# Patient Record
Sex: MALE | Race: BLACK OR AFRICAN AMERICAN | Employment: PART TIME | URBAN - METROPOLITAN AREA
[De-identification: names, ages, dates, MRNs, and addresses within clinical notes are randomized per-mention and may not be internally consistent; named-entity substitution may affect disease eponyms.]

---

## 2017-09-07 ENCOUNTER — ALLIED HEALTH/NURSE VISIT (OUTPATIENT)
Dept: NURSING | Facility: CLINIC | Age: 42
End: 2017-09-07
Payer: COMMERCIAL

## 2017-09-07 VITALS
TEMPERATURE: 97.9 F | OXYGEN SATURATION: 97 % | HEART RATE: 80 BPM | SYSTOLIC BLOOD PRESSURE: 115 MMHG | DIASTOLIC BLOOD PRESSURE: 77 MMHG

## 2017-09-07 DIAGNOSIS — R07.1 CHEST PAIN ON BREATHING: ICD-10-CM

## 2017-09-07 DIAGNOSIS — R05.9 COUGH: Primary | ICD-10-CM

## 2017-09-07 PROCEDURE — 99207 ZZC NO CHARGE NURSE ONLY: CPT

## 2017-09-07 NOTE — MR AVS SNAPSHOT
"              After Visit Summary   2017    Malik Keller    MRN: [id number]           Patient Information     Date Of Birth          1975        Visit Information        Provider Department      2017 1:30 PM HP RN/TRIAGE NURSE ONLY [facility]        Today's Diagnoses     Cough    -  1    Chest pain on breathing           Follow-ups after your visit        Who to contact     If you have questions or need follow up information about today's clinic visit or your schedule please contact [facility] directly at [phone].  Normal or non-critical lab and imaging results will be communicated to you by IMScoutinghart, letter or phone within 4 business days after the clinic has received the results. If you do not hear from us within 7 days, please contact the clinic through H?RELt or phone. If you have a critical or abnormal lab result, we will notify you by phone as soon as possible.  Submit refill requests through Woopie or call your pharmacy and they will forward the refill request to us. Please allow 3 business days for your refill to be completed.          Additional Information About Your Visit        MyChart Information     Woopie lets you send messages to your doctor, view your test results, renew your prescriptions, schedule appointments and more. To sign up, go to [email or web address] . Click on \"Log in\" on the left side of the screen, which will take you to the Welcome page. Then click on \"Sign up Now\" on the right side of the page.     You will be asked to enter the access code listed below, as well as some personal information. Please follow the directions to create your username and password.     Your access code is: [email or web address]  Expires: 2017  1:38 PM     Your access code will  in 90 days. If you need help or a new code, please call your [facility] or [phone].        Care EveryWhere ID     This is your Care EveryWhere ID. This could " be used by other organizations to access your [place] medical records  [id number]        Your Vitals Were     Pulse Temperature Pulse Oximetry             80 97.9  F (36.6  C) (Oral) 97%          Blood Pressure from Last 3 Encounters:   09/07/17 115/77   02/13/15 104/58   11/03/14 130/79    Weight from Last 3 Encounters:   02/13/15 176 lb (79.8 kg)   11/03/14 172 lb (78 kg)              Today, you had the following     No orders found for display       Primary Care Provider    None Specified       No primary provider on file.        Equal Access to Services     [facility]: Hadii emma plunkett hadasho Sojuno, waaxda luqadaha, qaybta kaalmada christopher, genoveva madden . So [facility] [phone].    ATENCIÓN: Si habla español, tiene a muñoz disposición servicios gratuitos de asistencia lingüística. Llame al [phone].    We comply with applicable federal civil rights laws and [place] laws. We do not discriminate on the basis of race, color, national origin, age, disability sex, sexual orientation or gender identity.            Thank you!     Thank you for choosing [facility]  for your care. Our goal is always to provide you with excellent care. Hearing back from our patients is one way we can continue to improve our services. Please take a few minutes to complete the written survey that you may receive in the mail after your visit with us. Thank you!             Your Updated Medication List - Protect others around you: Learn how to safely use, store and throw away your medicines at www.disposemymeds.org.          This list is accurate as of: 9/7/17  1:38 PM.  Always use your most recent med list.                   Brand Name Dispense Instructions for use Diagnosis    albuterol 108 (90 BASE) MCG/ACT Inhaler    PROAIR HFA/PROVENTIL HFA/VENTOLIN HFA    1 Inhaler    Inhale 2 puffs into the lungs every 4 hours as needed for shortness of breath / dyspnea or wheezing    Mild  intermittent asthma       fluticasone-salmeterol 100-50 MCG/DOSE diskus inhaler    ADVAIR    1 Inhaler    Inhale 1 puff into the lungs 2 times daily    Wheezing       ipratropium - albuterol 0.5 mg/2.5 mg/3 mL 0.5-2.5 (3) MG/3ML neb solution    DUONEB    1 vial    1 neb in clinic    Mild intermittent asthma

## 2017-09-07 NOTE — NURSING NOTE
Triaged walkin patient who c/o right upper chest pain onset Tuesday.  Is a smoker.  Pain Worsens with taking a deep breath.  Denies SOB.  States he has had bouts of asthma flare ups in past and has had an inhaler at home.  No wheezing noted at this time that is audible.   Has a cough sometimes productive with white sputum.  VSS. Not in acute distress. 02 Sats within normal range.    Plan:  Offered appt today. He was not able to make that time so would like ot come back for UC at 5 pm.    Patient leaves ambulatory in no distress and will come back he states.  He does not state he has a primary care clinic.  Aidee Ryan RN

## 2020-05-10 ENCOUNTER — OFFICE VISIT (OUTPATIENT)
Dept: URGENT CARE | Facility: URGENT CARE | Age: 45
End: 2020-05-10

## 2020-05-10 VITALS
DIASTOLIC BLOOD PRESSURE: 70 MMHG | BODY MASS INDEX: 28.8 KG/M2 | OXYGEN SATURATION: 99 % | WEIGHT: 183.9 LBS | TEMPERATURE: 97.9 F | HEART RATE: 91 BPM | SYSTOLIC BLOOD PRESSURE: 109 MMHG

## 2020-05-10 DIAGNOSIS — B02.9 HERPES ZOSTER WITHOUT COMPLICATION: Primary | ICD-10-CM

## 2020-05-10 PROCEDURE — 99203 OFFICE O/P NEW LOW 30 MIN: CPT | Performed by: FAMILY MEDICINE

## 2020-05-10 RX ORDER — VALACYCLOVIR HYDROCHLORIDE 1 G/1
1000 TABLET, FILM COATED ORAL 3 TIMES DAILY
Qty: 21 TABLET | Refills: 0 | Status: SHIPPED | OUTPATIENT
Start: 2020-05-10 | End: 2021-05-12

## 2020-05-10 ASSESSMENT — PAIN SCALES - GENERAL: PAINLEVEL: WORST PAIN (10)

## 2020-05-10 NOTE — PROGRESS NOTES
SUBJECTIVE:  Malik Keller is a 44 year old male who presents to the clinic today for rash on the left lateral neck and at the left posterior scalp accompanied by 10 out of 10 pain at the area of the rash.  .    Onset of rash was one day ago.   Rash is spreading.  .  .  Quality/symptoms of rash, pain.     rash seems to be worsening.  No fevers.  .  Previous history of a similar rash? no  Recent exposure history: none known  Patient started applying [place].  .    Past Medical History:    No major medical problems.     No current outpatient medications on file.     Social History     Tobacco Use     Smoking status: Former Smoker     Smokeless tobacco: Never Used   Substance Use Topics     Alcohol use: Not on file       ROS:  CONSTITUTIONAL:no fevers.    INTEGUMENTARY/SKIN:  Positive for painful rash.    EYES:  No eye pain/vision problems.   ENT/MOUTH: no ear/nose/mouth pain.    RESP: No shortness of breath.      EXAM:   /70   Pulse 91   Temp 97.9  F (36.6  C) (Tympanic)   Wt 83.4 kg (183 lb 14.4 oz)   SpO2 99%   BMI 28.80 kg/m    GENERAL: alert and severe pain.    SKIN: Rash description:    Distribution: dermatomal  Location: left lateral neck, left posterior scalp.  No left eye involvement.      Color: red,  Lesion type: vesicular, blotchy with no other findings    ASSESSMENT:  Shingles (Herpes Zoster)    PLAN:  Rx:  Valacyclovir and Tylenol #3  follow up if not better in 5-7days.   Ibuprofen  Go to the emergency room if you develop left eye pain, decreased vision, fevers.  Patient can place ice over the painful areas to numb up some of the pain.     Gideon Mcdonald MD

## 2020-05-11 ENCOUNTER — OFFICE VISIT (OUTPATIENT)
Dept: URGENT CARE | Facility: URGENT CARE | Age: 45
End: 2020-05-11

## 2020-05-11 ENCOUNTER — NURSE TRIAGE (OUTPATIENT)
Dept: NURSING | Facility: CLINIC | Age: 45
End: 2020-05-11

## 2020-05-11 VITALS
WEIGHT: 183 LBS | OXYGEN SATURATION: 97 % | SYSTOLIC BLOOD PRESSURE: 114 MMHG | HEART RATE: 87 BPM | DIASTOLIC BLOOD PRESSURE: 71 MMHG | TEMPERATURE: 99.5 F | BODY MASS INDEX: 28.66 KG/M2

## 2020-05-11 DIAGNOSIS — B02.9 HERPES ZOSTER WITHOUT COMPLICATION: Primary | ICD-10-CM

## 2020-05-11 PROCEDURE — 99213 OFFICE O/P EST LOW 20 MIN: CPT | Performed by: PHYSICIAN ASSISTANT

## 2020-05-11 RX ORDER — HYDROCODONE BITARTRATE AND ACETAMINOPHEN 7.5; 325 MG/1; MG/1
1 TABLET ORAL EVERY 6 HOURS PRN
Qty: 12 TABLET | Refills: 0 | Status: SHIPPED | OUTPATIENT
Start: 2020-05-11 | End: 2021-05-12

## 2020-05-11 NOTE — TELEPHONE ENCOUNTER
Pt calling regarding regarding visit yesterday.  Was dx with shingles.   Pt was given Tylenol #3 and Valacyclovir.   Tylenol #3 is not helping the pain.   Rates the pain 10/10.  Last took a Tylenol #3 was at 2:08.   Neck and ear, severe pain on left side.    RN recommended virtual  visit. Pt wants to go to urgent care near him, where he was seen yesterday.  Pt is sure that he wants to go to the urgent care, does not want phone visit.  RN offered to see if they have appointments at the urgent care to help prepare urgent care.  Pt was agreeable to this and was transferred to scheduling to assist making appointment.     Vivian Nelson RN   [facility] RN Triage       Reason for Disposition    SEVERE pain (e.g., excruciating)    Additional Information    Negative: Difficult to awaken or acting confused (e.g., disoriented, slurred speech)    Negative: Sounds like a life-threatening emergency to the triager    Negative: Patient sounds very sick or weak to the triager    Negative: [1] Localized rash AND [2] doesn't match the SYMPTOMS of shingles    Negative: [1] Back pain AND [2] doesn't match the SYMPTOMS of shingles    Negative: Fever > 100.5 F (38.1 C)    Negative: [1] Shingles rash AND [2] spots start appearing other places on body    Negative: [1] Shingles rash of face AND [2] eye pain or blurred vision    Negative: [1] Shingles rash of face or ear AND [2] earache or ringing in the ear    Negative: [1] Shingles rash of face AND [2] facial weakness    Negative: Shingles rash on the eyelid or tip of the nose    Negative: [1] Shingles rash (matches SYMPTOMS) AND [2] weak immune system (e.g., HIV positive,  cancer chemotherapy, chronic steroid treatment, splenectomy) AND [3] NOT taking antiviral medication    Protocols used: SHINGLES-A-

## 2020-05-11 NOTE — LETTER
[facility]  [address]  [address]  [place] [address]  Phone: [phone]  Fax: [phone]    May 11, 2020        Malik Keller  [address]  [place] [address]          To whom it may concern:    RE: Malik Keller    Patient was seen and treated today at our clinic. Please excuse from work 5/14/2020 - 5/16/2020.     Please contact me for questions or concerns.      Sincerely,        Shilpi Marin PA-C

## 2020-05-12 NOTE — PATIENT INSTRUCTIONS
Use a cool compress on the area and aloe vera.       Patient Education     Shingles  Shingles is a viral infection caused by the same virus that causes chicken pox. Anyone who has had chicken pox may get shingles later in life. The virus stays in the body, but remains asleep (dormant). Shingles often occurs in older persons or persons with lowered immunity. But it can affect anyone at any age.  Shingles starts as a tingling patch of skin on one side of the body. Small, painful blisters may then appear. The rash rarely spreads to other parts of the body.  Exposure to shingles can't cause shingles. However, it can cause chicken pox in anyone who has not had chicken pox or has not been vaccinated. The contagious period ends when all blisters have crusted over, generally 1 to 2 weeks after the illness starts.  After the blisters heal, the affected skin may be sensitive or painful for weeks or months, gradually resolving over time. But, sometimes this can last longer and be permanent (called postherpetic neuralgia.)  Shingles vaccines are available. Vaccination can help prevent shingles or make it less painful. It is generally recommended for adults older than 50, even if you've had singles in the past. Talk with your healthcare provider about when to get vaccinated and which vaccine is best for you.  Home care    Medicines may be prescribed to help relieve pain. Take these medicines as directed. Ask your healthcare provider or pharmacist before using over-the-counter medicines for helping treat pain and itching.    In certain cases, antiviral medicines may be prescribed to reduce pain, shorten the illness, and prevent neuralgia. Take these medicines as directed.    Compresses made from a solution of cool water mixed with cornstarch or baking soda may help relieve pain and itching.     Gently wash skin daily with soap and water to help prevent infection. Be certain to rinse off all of the soap, which can be  irritating.    Trim fingernails and try not to scratch. Scratching the sores may leave scars.    Stay home from work or school until all blisters have formed a crust and you are no longer contagious.  Follow-up care  Follow up with your healthcare provider, or as directed.  When to seek medical advice    Fever of 100.4 F (38 C) or higher, or as directed by your healthcare provider    Affected skin is on the face or neck and any of the following occur:  ? Headache  ? Eye pain  ? Changes in vision  ? Sores near the eye  ? Weakness of facial muscles    Blisters occurring on new areas of the body    Pain, redness, or swelling of a joint    Signs of skin infection: colored drainage from the sores, warmth, increasing redness, fever, or increasing pain  Date Last Reviewed: 4/1/2018 2000-2019 [employer]. [address] [place] [id number]. All rights reserved. This information is not intended as a substitute for professional medical care. Always follow your healthcare professional's instructions.

## 2021-05-12 ENCOUNTER — OFFICE VISIT (OUTPATIENT)
Dept: URGENT CARE | Facility: URGENT CARE | Age: 46
End: 2021-05-12
Payer: OTHER GOVERNMENT

## 2021-05-12 VITALS
DIASTOLIC BLOOD PRESSURE: 84 MMHG | HEART RATE: 78 BPM | OXYGEN SATURATION: 98 % | SYSTOLIC BLOOD PRESSURE: 139 MMHG | TEMPERATURE: 98 F | RESPIRATION RATE: 20 BRPM

## 2021-05-12 DIAGNOSIS — J45.21 MILD INTERMITTENT ASTHMA WITH EXACERBATION: Primary | ICD-10-CM

## 2021-05-12 DIAGNOSIS — U07.1 2019 NOVEL CORONAVIRUS DISEASE (COVID-19): ICD-10-CM

## 2021-05-12 DIAGNOSIS — R05.9 COUGH: ICD-10-CM

## 2021-05-12 PROCEDURE — 99214 OFFICE O/P EST MOD 30 MIN: CPT | Performed by: FAMILY MEDICINE

## 2021-05-12 RX ORDER — ALBUTEROL SULFATE 90 UG/1
2 AEROSOL, METERED RESPIRATORY (INHALATION) EVERY 6 HOURS PRN
Qty: 18 G | Refills: 0 | Status: SHIPPED | OUTPATIENT
Start: 2021-05-12

## 2021-05-12 NOTE — PROGRESS NOTES
SUBJECTIVE:   Malik Keller is a 45 year old male presenting with a chief complaint of cough.  Endorsed wheezing.  No fever.  Onset of symptoms was 3 week(s) ago.  Course of illness is worsening.    Severity moderate  Current and Associated symptoms: cough, wheezing  Treatment measures tried include Fluids and Rest.  Predisposing factors include asthma.    COVID positive April 12, denies any symptoms.  Patient states that had gathering with friends and was told that friends were positive so he went to get tested.    Coughing intermittent, worse when laying down.    Has old inhaler, last time used was 2 years ago.    Prior TOB use.  Negative for family history of asthma    History reviewed. No pertinent past medical history.  Current Outpatient Medications   Medication Sig Dispense Refill     albuterol (PROAIR HFA/PROVENTIL HFA/VENTOLIN HFA) 108 (90 Base) MCG/ACT inhaler Inhale 2 puffs into the lungs every 6 hours as needed for shortness of breath / dyspnea or wheezing 18 g 0     Social History     Tobacco Use     Smoking status: Former Smoker     Smokeless tobacco: Never Used   Substance Use Topics     Alcohol use: Not on file       ROS:  Review of systems negative except as stated above.    OBJECTIVE:  /84   Pulse 78   Temp 98  F (36.7  C) (Tympanic)   Resp 20   SpO2 98%   GENERAL APPEARANCE: healthy, alert and no distress  EYES: EOMI,  PERRL, conjunctiva clear  RESP: lungs with few scattered wheezes  CV: regular rates and rhythm, normal S1 S2, no murmur noted  PSYCH: mentation appears normal and affect normal/bright      ASSESSMENT/PLAN:  (J45.21) Mild intermittent asthma with exacerbation  (primary encounter diagnosis)  Plan: albuterol (PROAIR HFA/PROVENTIL HFA/VENTOLIN         HFA) 108 (90 Base) MCG/ACT inhaler            (R05) Cough  Plan: albuterol (PROAIR HFA/PROVENTIL HFA/VENTOLIN         HFA) 108 (90 Base) MCG/ACT inhaler            (U07.1) 2019 novel coronavirus disease (COVID-19)  Comment:  asymptomatic   Plan: reassurance      Reassurance given, reviewed symptomatic treatment with tylenol, ibuprofen, plenty of fluids and rest.  Reviewed COVID infection and that this virus may have triggered asthma flare.  Patient is outside 10 day quarantine already and reported that has received Moderna vaccination X1 already.  RX albuterol inhaler given for asthma flare.  Okay to take OTC cough syrup as needed.  Offered CXR but declined.    Recommend to establish with PCP for follow up of asthma care and routine CPE.    Andres Machado MD  May 12, 2021 11:18 AM

## 2021-05-19 ENCOUNTER — OFFICE VISIT (OUTPATIENT)
Dept: URGENT CARE | Facility: URGENT CARE | Age: 46
End: 2021-05-19

## 2021-05-19 ENCOUNTER — ANCILLARY PROCEDURE (OUTPATIENT)
Dept: GENERAL RADIOLOGY | Facility: CLINIC | Age: 46
End: 2021-05-19
Attending: FAMILY MEDICINE

## 2021-05-19 VITALS
HEART RATE: 78 BPM | RESPIRATION RATE: 20 BRPM | DIASTOLIC BLOOD PRESSURE: 78 MMHG | OXYGEN SATURATION: 99 % | SYSTOLIC BLOOD PRESSURE: 120 MMHG | TEMPERATURE: 98 F

## 2021-05-19 DIAGNOSIS — R05.3 PERSISTENT COUGH: Primary | ICD-10-CM

## 2021-05-19 DIAGNOSIS — J45.909 MILD ASTHMA WITHOUT COMPLICATION, UNSPECIFIED WHETHER PERSISTENT: ICD-10-CM

## 2021-05-19 DIAGNOSIS — R06.2 WHEEZING: ICD-10-CM

## 2021-05-19 PROCEDURE — 71046 X-RAY EXAM CHEST 2 VIEWS: CPT | Performed by: RADIOLOGY

## 2021-05-19 PROCEDURE — 99214 OFFICE O/P EST MOD 30 MIN: CPT | Performed by: FAMILY MEDICINE

## 2021-05-19 RX ORDER — BENZONATATE 100 MG/1
100 CAPSULE ORAL 3 TIMES DAILY PRN
Qty: 30 CAPSULE | Refills: 0 | Status: SHIPPED | OUTPATIENT
Start: 2021-05-19 | End: 2021-05-27

## 2021-05-19 RX ORDER — CODEINE PHOSPHATE AND GUAIFENESIN 10; 100 MG/5ML; MG/5ML
1-2 SOLUTION ORAL EVERY 4 HOURS PRN
Qty: 118 ML | Refills: 0 | Status: SHIPPED | OUTPATIENT
Start: 2021-05-19 | End: 2021-05-27

## 2021-05-19 NOTE — PROGRESS NOTES
Chief Complaint   Patient presents with     Urgent Care     Cough     COUGH PT WAS SEEN last wk        Medical Decision Making:    ASSESMENT AND PLAN     Malik was seen today for urgent care and cough.    Diagnoses and all orders for this visit:    Persistent cough  -     XR Chest 2 Views    Wheezing    Mild asthma without complication, unspecified whether persistent        Tylenol, Fluids, Rest and Rx Asthma exacerbation  Albuterol MDI  Patient to continue an inhaler for the wheezing symptoms: Tessalon Perles help with the coughing in the day also Robitussin with codeine at night advised not to drive when taking the codeine medication.  Oxygen saturation was 99% when rechecked discussed with patient if notices any worsening breathing or any chest pain or high fever chills or any exertional symptoms should follow-up.  I have reviewed the nursing notes.    Differential Diagnosis:  [facility] Adult/Peds:  Asthma, Asthma exacerbation, Pneumonia, Viral pharyngitis, Viral syndrome, Viral upper respiratory illness and covid     Review of the result(s) of each unique test -     X-Ray was done, my findings are:normal no infiltrate noted      Time  spent on the date of the encounter doing chart review, review of test results, interpretation of tests, patient visit and documentation     If not improving or if condition worsens, follow up with your Primary Care Provider  Patient is outside 10 day quarantine already and reported that has received Moderna vaccination X1 already.      see orders in Epic  Pt verbalized and agreed with the plan and is aware of the worsening symptoms for which would need to follow up .  Pt was stable during time of discharge from the clinic     SUBJECTIVE     Malik Keller is a 45 year old male presenting with a chief complaint of    Chief Complaint   Patient presents with     Urgent Care     Cough     COUGH PT WAS SEEN last wk            Malik Keller is a 45 year old male with h/o covid 1 month  ago  presenting with a chief complaint of coughing ,productive, denies any fever or chills   Has no chest pain or acute sob  . He is an established patient of [facility].  Onset of symptoms was 2 week(s) ago.  Course of illness is worsening.    Severity moderate  Current and Associated symptoms: cough - productive and wheezing  Treatment measures tried include Inhaler (name: ).  Predisposing factors include HX of asthma.  He was seen 2 ee  History reviewed. No pertinent past medical history.  Current Outpatient Medications   Medication Sig Dispense Refill     albuterol (PROAIR HFA/PROVENTIL HFA/VENTOLIN HFA) 108 (90 Base) MCG/ACT inhaler Inhale 2 puffs into the lungs every 6 hours as needed for shortness of breath / dyspnea or wheezing 18 g 0     Social History     Tobacco Use     Smoking status: Former Smoker     Smokeless tobacco: Never Used   Substance Use Topics     Alcohol use: Not on file     History reviewed. No pertinent family history.      ROS:    10 point ROS of systems including Constitutional, Eyes,  Cardiovascular, Gastroenterology, Genitourinary, Integumentary, Muscularskeletal, Psychiatric ,neurological were all negative except for pertinent positives noted in my HPI         OBJECTIVE:    /78   Pulse 78   Temp 98  F (36.7  C) (Rectal)   Resp 20   SpO2 93%   GENERAL APPEARANCE: healthy, alert and no distress  EYES: EOMI,  PERRL, conjunctiva clear  HENT: ear canals and TM's normal.  Nose and mouth without ulcers, erythema or lesions  NECK: supple, nontender, no lymphadenopathy  RESP: lungs clear to auscultation - no rales, rhonchi positive for  wheezes  CV: regular rates and rhythm, normal S1 S2, no murmur noted  ABDOMEN:  soft, nontender, no HSM or masses and bowel sounds normal  SKIN: no suspicious lesions or rashes  PSYCH: mentation appears normal  Physical Exam      (Note was completed, in part, with [employer] voice-recognition software. Documentation reviewed, but some grammatical, spelling,  and word errors may remain.)  Katty Shelton MD on 5/19/2021 at 1:20 PM

## 2021-05-21 ENCOUNTER — OFFICE VISIT (OUTPATIENT)
Dept: URGENT CARE | Facility: URGENT CARE | Age: 46
End: 2021-05-21

## 2021-05-21 VITALS
SYSTOLIC BLOOD PRESSURE: 118 MMHG | BODY MASS INDEX: 28.44 KG/M2 | RESPIRATION RATE: 20 BRPM | OXYGEN SATURATION: 99 % | HEART RATE: 90 BPM | TEMPERATURE: 99.1 F | WEIGHT: 181.6 LBS | DIASTOLIC BLOOD PRESSURE: 72 MMHG

## 2021-05-21 DIAGNOSIS — J45.41 MODERATE PERSISTENT ASTHMA WITH ACUTE EXACERBATION: Primary | ICD-10-CM

## 2021-05-21 PROCEDURE — 99214 OFFICE O/P EST MOD 30 MIN: CPT | Mod: 25 | Performed by: PHYSICIAN ASSISTANT

## 2021-05-21 PROCEDURE — 94640 AIRWAY INHALATION TREATMENT: CPT | Mod: ZZRT | Performed by: PHYSICIAN ASSISTANT

## 2021-05-21 RX ORDER — PREDNISONE 20 MG/1
40 TABLET ORAL DAILY
Qty: 10 TABLET | Refills: 0 | Status: SHIPPED | OUTPATIENT
Start: 2021-05-21 | End: 2021-05-27

## 2021-05-21 RX ORDER — AZITHROMYCIN 250 MG/1
TABLET, FILM COATED ORAL
Qty: 6 TABLET | Refills: 0 | Status: SHIPPED | OUTPATIENT
Start: 2021-05-21 | End: 2021-05-21

## 2021-05-21 RX ORDER — PREDNISONE 20 MG/1
40 TABLET ORAL DAILY
Qty: 10 TABLET | Refills: 0 | Status: SHIPPED | OUTPATIENT
Start: 2021-05-21 | End: 2021-05-21

## 2021-05-21 RX ORDER — AZITHROMYCIN 250 MG/1
TABLET, FILM COATED ORAL
Qty: 6 TABLET | Refills: 0 | Status: SHIPPED | OUTPATIENT
Start: 2021-05-21 | End: 2021-05-27

## 2021-05-21 RX ORDER — IPRATROPIUM BROMIDE AND ALBUTEROL SULFATE 2.5; .5 MG/3ML; MG/3ML
3 SOLUTION RESPIRATORY (INHALATION) ONCE
Status: COMPLETED | OUTPATIENT
Start: 2021-05-21 | End: 2021-05-21

## 2021-05-21 RX ADMIN — IPRATROPIUM BROMIDE AND ALBUTEROL SULFATE 3 ML: 2.5; .5 SOLUTION RESPIRATORY (INHALATION) at 14:58

## 2021-05-21 NOTE — PATIENT INSTRUCTIONS
Prednisone -- to take for wheezing. Start to take this TODAY.   The antibiotic azithromycin, to be used if you have fever, chills or worsening symptoms  TO ER / UC if you develop chest pain or significant shortness of breath.       Patient Education     Asthma  What is asthma?  Asthma is a long-term (chronic) ?lung disease. The airways react to triggers (allergens and irritants). This makes it hard to breathe. With exposure to triggers, changes can occur such as:     The airways become swollen and inflamed.    The muscles around the airways tighten.    More mucus is made. This leads to mucus plugs.  All of these changes make the airways narrow. This makes it hard for air to go out of the lungs. And fresh oxygen can't get into the body.   What causes asthma?  Experts don't know the exact cause of asthma. They believe it is partly inherited. The environment, infections, and chemicals released by the body also play a role.   Exercise causes symptoms in many people with asthma. Symptoms can occur during exercise. They can also occur right after exercise. In some people, stress or strong feelings can cause symptoms.   All of these may be asthma triggers:   Allergens Respiratory problem         Pollens (trees, grasses, and weeds)    Mold    Pets    Dust and dust mites    Cockroaches    Mice       Nasal allergies    Sinus infections    The flu    Viral infections, including the common cold   Irritants Medicines         Strong odors from perfumes, , cooking, paints, and varnishes    Chemicals (gases, fumes)    Air pollution    Changing weather (temperature, barometric pressure, humidity, and strong winds)    Smoke (tobacco-inhaled or secondhand)       Aspirin    NSAIDs (nonsteroidal anti-inflammatory drugs) such as ibuprofen   Other conditions Other         GERD (gastroesophageal reflux)    Sleep apnea    Overweight    Depression       Exercise, especially in cold weather    Strong feelings that go along with  laughing or crying       Who is at risk for asthma?  It is most common in:     Children and teens ages 5 to17    People living in cities  Other factors include:    Personal or family history of asthma or allergies    Exposure to secondhand tobacco smoke    Children with a family history of asthma    Children who have allergies or atopic dermatitis    Children exposed to secondhand and tobacco smoke    Living in areas with high levels of environmental air pollution  What are the symptoms of asthma?  Symptoms include:    Trouble breathing or shortness of breath    Chest tightness    Wheezing or a whistling sound when breathing    Coughing    Breathing becomes harder and may hurt    Talking and sleeping may be harder with severe symptoms  How is asthma diagnosed?  Your healthcare provider will ask about your health history. They will give you a physical exam. You will also have other tests. An important test is spirometry.   A spirometer is a device used to find out how well the lungs are working. It measures the amount and speed of air breathed out.   You may have other tests. These are done to check for conditions such as allergies.   How is asthma treated?  Treatment will depend on your symptoms, age, and general health. It will also depend on how severe the condition is.   There is no cure for asthma. It can often be controlled by staying away from triggers. And by taking medicines as prescribed by your healthcare provider.   Watching for symptoms and taking early, appropriate action is a key part of asthma care. So is knowing what to do if symptoms get worse. Experts advise making an Asthma Action Plan with your provider and educating your family and close friends about its purpose and content. This will help them provide correct asthma care if you are ill and can't care for yourself.   Medicines for asthma  The 2 types of asthma medicines are long-term control and short-term (quick-relief) medicines. Long-term  control medicines are often taken every day. They help prevent symptoms. Quick-relief medicines calm asthma symptoms fast. But they only last for a short time. You may take either type of medicine alone. Some people take both.   Your healthcare provider should regularly check and adjust your medicines as needed.   Long-term control medicines  At first, it may take a few weeks for long-term control medicines to work. You must take these medicines every day. These medicines include:     Anti-inflammatory medicines. These medicines reduce or prevent airway swelling.    Bronchodilators. These relax muscles around the airways.    Leukotriene modifiers. These block the action of chemicals called leukotrienes. These are chemicals that cause airways to be inflamed and narrowed.     Biologic therapy. For people with asthma that isn't well controlled despite inhaler therapy, there are some newer medicines. These target the inflammatory cells in the body that start the asthma reaction. These medicines include anti-IL4, Anti-IL5, and anti-IgE medicines. They are often given by shot (injection) or infusion.  Quick-relief medicines  Quick-relief medicines quickly relax the muscles around the airways. But the relief only lasts about 2 to 3 hours.   These medicines may include:    Inhaled short-acting beta2-agonists .  These help relax muscles around the airways.    Inhaled anticholinergics . These block a chemical in the body called acetylcholine. This chemical contracts the muscles. It also causes more mucus in the airways.  Inhalation devices for asthma  Inhaled medicines go right to the lungs. They have fewer side effects than medicines taken by mouth. Inhaled medicines may be anti-inflammatory or bronchodilating. Or they may be both. The devices used are:     Metered-dose inhaler (MDI). This is the most common type of inhaler. It uses a chemical to push the medicine out of the inhaler. MDIs are held in front of or put into the  mouth. Then the medicine is released in puffs. Or they may be used with a spacer device.    Nebulizer. This device sprays a fine mist of medicine. This is done through a mask using air under pressure, or an ultrasonic machine. A mouthpiece or mask is connected to a machine by plastic tubing to deliver medicine.    Dry powder or rotary inhaler.  These inhalers deliver powered medicine as you breathe.  Living with asthma  Staying away from triggers is key in managing asthma. Triggers are specific to the individual and may include such things as allergens, irritants, other health problems, exercise, medicines, and strong emotions. The following can help you limit your exposure:   Allergies    Dust. Dust is the most common year-round allergen. The allergy is caused by tiny dust mites. Dust mites are found in mattresses, carpets, and fabric-covered (upholstered) furniture such as sofas and chairs. They live best in warm, humid conditions. It's important to limit your exposure. Keep your living area as clean as possible by vacuuming and dusting on a weekly basis. Keep the use of carpets to a minimum, and take extra care in the bedroom. Put dust mite covers on your mattress, box spring, and pillows.    Pollens. You may be allergic to pollen. If so, during pollen season keep all car and house windows closed. Use air conditioning. If you have been outside, shower, wash your hair, and change clothes when you go inside.     Pets. Pets that have fur or feathers often cause allergies. If you have pets, try not to touch them. If you do pet or handle them, wash your hands afterward. Keep pets off your furniture, bed, and out of your bedroom. Have someone brush and bathe your pet often.    Mold and mildew.  These can trigger asthma. When outside, stay away from damp, shady areas. Use exhaust fans when cooking or bathing. Keep indoor humidity below 45%. And drain and clean your dehumidifier often.    Exercise  Exercise is a common  asthma trigger. But don't limit sports or exercise unless a healthcare provider tells you to. Exercise is good for your health and lungs. Swimming, golf, and karate are good choices if you have asthma. Always warm up before exercise. And cool down after. Ask your provider about using your quick-relief medicine before starting exercise. Keep a log of what types of exercise trigger asthma problems and talk with your provider about possible ways to manage your symptoms.   Irritants  If you smoke, quit. This is hard to do, but your provider can help provide resources to aid your success.   Stay away from secondhand and thirdhand smoke. Don't let people smoke in your car or in your home.   In addition, stay away from other types of smoke. Don t use wood stoves or kerosene heaters. If you live in an area with air pollution, stay indoors during bad air days and wear a mask if you have to go outside. Also stay away from strong perfumes, cleaning products, fresh paint, and other things with strong odors.   Medicines  Some medicines can make asthma symptoms worse. These medicines include aspirin, NSAIDs (nonsteroidal anti-inflammatory drugs), and beta-blockers. Talk with your provider about your asthma history and medicine use.   Other health problems  Some health problems can make it harder to control asthma. These include:     Respiratory infections such as colds and the flu    GERD (gastroesophageal reflux) and heartburn    Being overweight    Sleep apnea    Depression    Work with your healthcare provider to treat any of these problems.   Strong emotions  The strong feelings that go with laughing and crying can trigger asthma symptoms. You can learn how to better manage your emotions.   Key points about asthma    Asthma is a long-term (chronic) lung disease.    Triggers irritate sensitive airways. This makes it hard to breathe.    Staying away from triggers is an important part of treatment.    Long-term medicines control  symptoms. They are taken every day, even when you feel well.    Rescue medicines provide quick symptom relief. But they are short-term.    An Asthma Action Plan can help patients and family members take appropriate, timely steps to control symptoms.    Next steps  Tips to help you get the most from a visit to your healthcare provider:    Know the reason for your visit and what you want to happen.    Before your visit, write down questions you want answered.    Bring someone with you to help you ask questions and remember what your provider tells you.    At the visit, write down the name of a new diagnosis, and any new medicines, treatments, or tests. Also write down any new instructions your provider gives you.    Know why a new medicine or treatment is prescribed, and how it will help you. Also know what the side effects are.    Ask if your condition can be treated in other ways.    Know why a test or procedure is recommended and what the results could mean.    Know what to expect if you do not take the medicine or have the test or procedure.    If you have a follow-up appointment, write down the date, time, and purpose for that visit.    Know how you can contact your provider if you have questions.  Jay last reviewed this educational content on 12/1/2020 2000-2021 [employer]. All rights reserved. This information is not intended as a substitute for professional medical care. Always follow your healthcare professional's instructions.

## 2021-05-21 NOTE — PROGRESS NOTES
Assessment & Plan     1. Moderate persistent asthma with acute exacerbation  45 year old male with a history of asthma and no recent flares presents to the clinic for evaluation of wheezing and cough. On exam, expiratory wheezing is appreciated. He is not tachypneic  Or tachycardic. O2 is stable at 99%. CXR from 2 days ago is negative.  He was given a duoneb with improvement in symptoms. Will treat with prednisone. Patient does not have health insurance, RX was given for azithromycin if symptoms do not improve as expected or symptoms of sinobronchitis. Encouraged continuing with albuterol.   Significant SOB or development of chest pain to ER  - ipratropium - albuterol 0.5 mg/2.5 mg/3 mL (DUONEB) neb solution 3 mL  - INHALATION/NEBULIZER TREATMENT, INITIAL  - predniSONE (DELTASONE) 20 MG tablet; Take 2 tablets (40 mg) by mouth daily  Dispense: 10 tablet; Refill: 0  - azithromycin (ZITHROMAX) 250 MG tablet; Two tablets first day, then one tablet daily for four days.  Dispense: 6 tablet; Refill: 0      Return in about 1 week (around 5/28/2021), or if symptoms worsen or fail to improve.    Diagnosis and treatment plan was reviewed with patient and/or family.   We went over any labs or imaging. Discussed worsening symptoms or little to no relief despite treatment plan to follow-up with PCP or return to clinic.  Patient verbalizes understanding. All questions were addressed and answered.     Shilpi Marin PA-C  [facility]    Addend: Patient pharmacy changed from [facility] to [facility]   CHIEF COMPLAINT:   Chief Complaint   Patient presents with     Urgent Care     Follow up on cough,not improving. Seen on 5/19 at      Chip Gan is a 45 year old male who presents to clinic today for evaluation of cough and wheezing. First noticed the symptoms 3 weeks ago. He has been taking albuterol which helps periodically with wheezing, but feels like he needs to take it every 3 or so hours. He  is not getting relief with cough syrup or tessalon. Denies having fever, chills, hemoptysis, chest pain or pleurisy. No other allergy symptoms.  Dx with COVID19 on 4/12.       History reviewed. No pertinent past medical history.  History reviewed. No pertinent surgical history.  Social History     Tobacco Use     Smoking status: Former Smoker     Smokeless tobacco: Never Used   Substance Use Topics     Alcohol use: Not on file     Current Outpatient Medications   Medication     albuterol (PROAIR HFA/PROVENTIL HFA/VENTOLIN HFA) 108 (90 Base) MCG/ACT inhaler     azithromycin (ZITHROMAX) 250 MG tablet     predniSONE (DELTASONE) 20 MG tablet     benzonatate (TESSALON) 100 MG capsule     guaiFENesin-codeine (ROBITUSSIN AC) 100-10 MG/5ML solution     Current Facility-Administered Medications   Medication     ipratropium - albuterol 0.5 mg/2.5 mg/3 mL (DUONEB) neb solution 3 mL     No Known Allergies    10 point ROS of systems were all negative except for pertinent positives noted in my HPI.      Exam:   /72   Pulse 90   Temp 99.1  F (37.3  C) (Oral)   Resp 20   Wt 82.4 kg (181 lb 9.6 oz)   SpO2 99%   BMI 28.44 kg/m    Constitutional: healthy, alert and no distress  Head: Normocephalic, atraumatic.  Eyes: conjunctiva clear, no drainage  ENT: TMs clear and shiny isadora, nasal mucosa pink and moist, throat without tonsillar hypertrophy or erythema  Neck: neck is supple, no cervical lymphadenopathy or nuchal rigidity  Cardiovascular: RRR  Respiratory: Expiratory wheezing posteriorly noted in upper lung fields.  Skin: no rashes  Neurologic: Speech clear, gait normal. Moves all extremities.

## 2021-05-27 ENCOUNTER — OFFICE VISIT (OUTPATIENT)
Dept: URGENT CARE | Facility: URGENT CARE | Age: 46
End: 2021-05-27

## 2021-05-27 VITALS
TEMPERATURE: 96.9 F | OXYGEN SATURATION: 98 % | DIASTOLIC BLOOD PRESSURE: 62 MMHG | HEART RATE: 93 BPM | WEIGHT: 181 LBS | SYSTOLIC BLOOD PRESSURE: 110 MMHG | BODY MASS INDEX: 28.35 KG/M2

## 2021-05-27 DIAGNOSIS — J98.01 BRONCHOSPASM: ICD-10-CM

## 2021-05-27 DIAGNOSIS — R05.3 PERSISTENT COUGH FOR 3 WEEKS OR LONGER: Primary | ICD-10-CM

## 2021-05-27 PROCEDURE — 99213 OFFICE O/P EST LOW 20 MIN: CPT | Performed by: PHYSICIAN ASSISTANT

## 2021-05-27 RX ORDER — FLUTICASONE PROPIONATE AND SALMETEROL 113; 14 UG/1; UG/1
1 POWDER, METERED RESPIRATORY (INHALATION) 2 TIMES DAILY
Qty: 1 EACH | Refills: 0 | Status: SHIPPED | OUTPATIENT
Start: 2021-05-27

## 2021-05-27 RX ORDER — FLUTICASONE PROPIONATE 110 UG/1
2 AEROSOL, METERED RESPIRATORY (INHALATION) 2 TIMES DAILY
Qty: 12 G | Refills: 0 | Status: SHIPPED | OUTPATIENT
Start: 2021-05-27 | End: 2021-05-27

## 2021-05-27 NOTE — PATIENT INSTRUCTIONS
Consider seeing a lung specialist or establishing with a primary care provider to be tested for asthma.  Use Flovent twice daily, and continue to use albuterol as needed for shortness of breath/cough that persists.  Go to ER for severe shortness of breath or chest pain.

## 2021-05-27 NOTE — PROGRESS NOTES
Assessment/Plan:    Differential diagnosis includes viral bronchitis vs asthma/COPD. Do not suspect bacterial infection such as pneumonia as CXR was normal on 5/19 and pt is afebrile, no sputum production. Advised pt see a pulmonologist but he declines as he does not have insurance. Will do trial of Flovent, with use of albuterol PRN.   Addendum-   Pharmacist called, Flovent is too expensive for pt. Fluticasone-salmeterol inhaler sent instead as this is more affordable. Encouraged pt to establish care with a PCP for further follow-up.  See patient instructions below.    At the end of the encounter, I discussed results, diagnosis, medications. Discussed red flags for immediate return to clinic/ER, as well as indications for follow up if no improvement. Patient understood and agreed to plan. Patient was stable for discharge.      ICD-10-CM    1. Persistent cough for 3 weeks or longer  R05 fluticasone-salmeterol (AIRDUO RESPICLICK) 113-14 MCG/ACT inhaler     DISCONTINUED: fluticasone (FLOVENT HFA) 110 MCG/ACT inhaler   2. Bronchospasm  J98.01 fluticasone-salmeterol (AIRDUO RESPICLICK) 113-14 MCG/ACT inhaler     DISCONTINUED: fluticasone (FLOVENT HFA) 110 MCG/ACT inhaler         Return in about 1 week (around 6/3/2021) for Recheck with primary care provider.    Nadira Dumont, KRISTY, MASHA  [facility]  -----------------------------------------------------------------------------------------------------------------------------------------------------    HPI:  Malik Keller is a 46 year old male who presents for evaluation of cough & wheezing onset 5 weeks ago. He tested positive for COVID on 4/12, but didn't have symptoms at the time. He was tested due to an exposure. He has been seen for this cough several times here in urgent care.  5/12/21- Rx albuterol inhaler  5/19/21- had CXR which was normal. Was prescribed Tessalon and Robitussin w/ codeine.   5/21/21- Rx 5 day course of 40 mg  prednisone, also Rx azithromycin to be taken if symptoms did not improve.     He notes he felt somewhat better (but not completely) while on the azithromycin/prednisone, but symptoms became worse again when he stopped taking them.  He has never been diagnosed with asthma. He had similar issues 3 years ago which were treated with an albuterol inhaler. He does have a smoking history, quit about 2 months ago.     Patient reports no fever/chills, chest pain, shortness of breath, nasal congestion, rhinorrhea, nausea, vomiting, diarrhea, rash, or any other symptoms.     History reviewed. No pertinent past medical history.    Vitals:    05/27/21 1004   BP: 110/62   Pulse: 93   Temp: 96.9  F (36.1  C)   TempSrc: Tympanic   SpO2: 98%   Weight: 82.1 kg (181 lb)       Physical Exam  Vitals signs and nursing note reviewed.   Cardiovascular:      Rate and Rhythm: Normal rate and regular rhythm.      Heart sounds: Normal heart sounds.   Pulmonary:      Effort: Pulmonary effort is normal.      Breath sounds: Examination of the right-lower field reveals wheezing. Examination of the left-lower field reveals wheezing. Wheezing present.   Neurological:      Mental Status: He is alert.         Labs/Imaging:  No results found for this or any previous visit (from the past 24 hour(s)).      Patient Instructions   Consider seeing a lung specialist or establishing with a primary care provider to be tested for asthma.  Use Flovent twice daily, and continue to use albuterol as needed for shortness of breath/cough that persists.  Go to ER for severe shortness of breath or chest pain.

## 2021-07-27 ENCOUNTER — OFFICE VISIT (OUTPATIENT)
Dept: URGENT CARE | Facility: URGENT CARE | Age: 46
End: 2021-07-27

## 2021-07-27 VITALS
SYSTOLIC BLOOD PRESSURE: 118 MMHG | HEART RATE: 96 BPM | OXYGEN SATURATION: 98 % | TEMPERATURE: 98 F | RESPIRATION RATE: 14 BRPM | DIASTOLIC BLOOD PRESSURE: 56 MMHG | BODY MASS INDEX: 28.04 KG/M2 | WEIGHT: 179 LBS

## 2021-07-27 DIAGNOSIS — J45.909 UNCOMPLICATED ASTHMA, UNSPECIFIED ASTHMA SEVERITY, UNSPECIFIED WHETHER PERSISTENT: Primary | ICD-10-CM

## 2021-07-27 PROCEDURE — 99214 OFFICE O/P EST MOD 30 MIN: CPT | Performed by: FAMILY MEDICINE

## 2021-07-27 RX ORDER — FLUTICASONE PROPIONATE AND SALMETEROL 113; 14 UG/1; UG/1
1 POWDER, METERED RESPIRATORY (INHALATION) 2 TIMES DAILY
Qty: 1 EACH | Refills: 0 | Status: SHIPPED | OUTPATIENT
Start: 2021-07-27 | End: 2021-08-23

## 2021-07-27 NOTE — PROGRESS NOTES
"ASSESSMENT/PLAN:    ICD-10-CM    1. Uncomplicated asthma, unspecified asthma severity, unspecified whether persistent  J45.909 fluticasone-salmeterol (AIRDUO RESPICLICK) 113-14 MCG/ACT inhaler     Hasn't established with primary care for ongoing asthma management. Discussed with patient that it's not ideal care to manage this using multiple providers in an urgent care setting and that we cannot order testing if the medications are not effective.    Encouraged patient to go upstairs after today's visit and make an appointment with a primary care provider in this clinic.  Pt is also without health insurance and would benefit from meeting with /care coordinator to help with insurance and prescription coverage/discount resources.    Asked that patient make this appointment for about 3 weeks from now so that he can be seen before he runs out of his controller inhaler.    SUBJECTIVE:  Pt presents today requesting refill of medication.  He does not know the name of what it is and just says \"It's the one in the computer.\"  We were able to determine by looking at images of inhalers on the Internet that he would like his AirDuo Respiclick to be refilled.  He says that he didn't need to use his blue inhaler (albuterol) at all while using this medication.  He doesn't remember doing any testing that sounds like spirometry.    Denies fevers.  Has a productive cough at times.  Has been more short of breath since running out of his controller inhaler 2 weeks ago.    OBJECTIVE:  /56   Pulse 96   Temp 98  F (36.7  C)   Resp 14   Wt 81.2 kg (179 lb)   SpO2 98%   BMI 28.04 kg/m    GEN: well-appearing, in NAD  Lungs:  CTAB, slightly prolonged expiratory phase, no wheezes or crackles  CV:  RRR, no murmurs heard      "

## 2021-07-27 NOTE — PATIENT INSTRUCTIONS
Restart yellow inhaler twice a day everyday.      Use the blue inhaler on bad days when you are wheezing or coughing a lot.

## 2021-08-23 ENCOUNTER — OFFICE VISIT (OUTPATIENT)
Dept: PEDIATRICS | Facility: CLINIC | Age: 46
End: 2021-08-23
Payer: MEDICAID

## 2021-08-23 VITALS
HEIGHT: 67 IN | RESPIRATION RATE: 16 BRPM | DIASTOLIC BLOOD PRESSURE: 70 MMHG | BODY MASS INDEX: 28.09 KG/M2 | OXYGEN SATURATION: 99 % | TEMPERATURE: 98.4 F | HEART RATE: 84 BPM | WEIGHT: 179 LBS | SYSTOLIC BLOOD PRESSURE: 104 MMHG

## 2021-08-23 DIAGNOSIS — J45.30 MILD PERSISTENT ASTHMA WITHOUT COMPLICATION: ICD-10-CM

## 2021-08-23 PROCEDURE — 99213 OFFICE O/P EST LOW 20 MIN: CPT | Performed by: INTERNAL MEDICINE

## 2021-08-23 RX ORDER — FLUTICASONE PROPIONATE AND SALMETEROL 113; 14 UG/1; UG/1
1 POWDER, METERED RESPIRATORY (INHALATION) 2 TIMES DAILY
Qty: 1 EACH | Refills: 11 | Status: SHIPPED | OUTPATIENT
Start: 2021-08-23

## 2021-08-23 ASSESSMENT — MIFFLIN-ST. JEOR: SCORE: 1660.57

## 2021-08-23 NOTE — PATIENT INSTRUCTIONS
Review asthma action plan  Take Air Duo inhaler 1 puff twice daily--long term  Next follow-up in 6 months

## 2021-08-23 NOTE — LETTER
My Asthma Action Plan    Name: Malik Keller   YOB: 1975  Date: 8/23/2021   My doctor: Desmond Lomax MD   My clinic: [facility]        My Control Medicine: Fluticasone propionate + salmeterol (AirDuo RespiClick) -  113/14 mcg 1 puff twice daily  My Rescue Medicine: Albuterol (Proair/Ventolin/Proventil HFA) 2-4 puffs EVERY 4 HOURS as needed. Use a spacer if recommended by your provider.   My Asthma Severity:   Mild Persistent  Know your asthma triggers: upper respiratory infections               GREEN ZONE   Good Control    I feel good    No cough or wheeze    Can work, sleep and play without asthma symptoms       Take your asthma control medicine every day.     1. If exercise triggers your asthma, take your rescue medication    15 minutes before exercise or sports, and    During exercise if you have asthma symptoms  2. Spacer to use with inhaler: If you have a spacer, make sure to use it with your inhaler             YELLOW ZONE Getting Worse  I have ANY of these:    I do not feel good    Cough or wheeze    Chest feels tight    Wake up at night   1. Keep taking your Green Zone medications  2. Start taking your rescue medicine:    every 20 minutes for up to 1 hour. Then every 4 hours for 24-48 hours.  3. If you stay in the Yellow Zone for more than 12-24 hours, contact your doctor.  4. If you do not return to the Green Zone in 12-24 hours or you get worse, start taking your oral steroid medicine if prescribed by your provider.           RED ZONE Medical Alert - Get Help  I have ANY of these:    I feel awful    Medicine is not helping    Breathing getting harder    Trouble walking or talking    Nose opens wide to breathe       1. Take your rescue medicine NOW  2. If your provider has prescribed an oral steroid medicine, start taking it NOW  3. Call your doctor NOW  4. If you are still in the Red Zone after 20 minutes and you have not reached your doctor:    Take your rescue  medicine again and    Call 911 or go to the emergency room right away    See your regular doctor within 2 weeks of an Emergency Room or Urgent Care visit for follow-up treatment.          Annual Reminders:  Meet with Asthma Educator,  Flu Shot in the Fall, consider Pneumonia Vaccination for patients with asthma (aged 19 and older).    Pharmacy: [id number] - [place], [place] - [address] [facility]    Electronically signed by Desmond Lomax MD   Date: 08/23/21                      Asthma Triggers  How To Control Things That Make Your Asthma Worse    Triggers are things that make your asthma worse.  Look at the list below to help you find your triggers and what you can do about them.  You can help prevent asthma flare-ups by staying away from your triggers.      Trigger                                                          What you can do   Cigarette Smoke  Tobacco smoke can make asthma worse. Do not allow smoking in your home, car or around you.  Be sure no one smokes at a child s day care or school.  If you smoke, ask your health care provider for ways to help you quit.  Ask family members to quit too.  Ask your health care provider for a referral to Quit Plan to help you quit smoking, or call 3-003-437-PLAN.     Colds, Flu, Bronchitis  These are common triggers of asthma. Wash your hands often.  Don t touch your eyes, nose or mouth.  Get a flu shot every year.     Dust Mites  These are tiny bugs that live in cloth or carpet. They are too small to see. Wash sheets and blankets in hot water every week.   Encase pillows and mattress in dust mite proof covers.  Avoid having carpet if you can. If you have carpet, vacuum weekly.   Use a dust mask and HEPA vacuum.   Pollen and Outdoor Mold  Some people are allergic to trees, grass, or weed pollen, or molds. Try to keep your windows closed.  Limit time out doors when pollen count is high.   Ask you health care provider about taking medicine during allergy  season.     Animal Dander  Some people are allergic to skin flakes, urine or saliva from pets with fur or feathers. Keep pets with fur or feathers out of your home.    If you can t keep the pet outdoors, then keep the pet out of your bedroom.  Keep the bedroom door closed.  Keep pets off cloth furniture and away from stuffed toys.     Mice, Rats, and Cockroaches   Some people are allergic to the waste from these pests.   Cover food and garbage.  Clean up spills and food crumbs.  Store grease in the refrigerator.   Keep food out of the bedroom.   Indoor Mold  This can be a trigger if your home has high moisture. Fix leaking faucets, pipes, or other sources of water.   Clean moldy surfaces.  Dehumidify basement if it is damp and smelly.   Smoke, Strong Odors, and Sprays  These can reduce air quality. Stay away from strong odors and sprays, such as perfume, powder, hair spray, paints, smoke incense, paint, cleaning products, candles and new carpet.   Exercise or Sports  Some people with asthma have this trigger. Be active!  Ask your doctor about taking medicine before sports or exercise to prevent symptoms.    Warm up for 5-10 minutes before and after sports or exercise.     Other Triggers of Asthma  Cold air:  Cover your nose and mouth with a scarf.  Sometimes laughing or crying can be a trigger.  Some medicines and food can trigger asthma.

## 2021-08-23 NOTE — PROGRESS NOTES
Assessment & Plan     (J45.30) Mild persistent asthma without complication  Comment: Adequate control.  Continue current medication. AAP reviewed and copy given  Patient will soon have insurance coverage to help with medication cost/instructed to contact clinic if he is unable to set up coverage.  Discussed care coordination referral if needed.  Plan: fluticasone-salmeterol (AIRDUO RESPICLICK)         113-14 MCG/ACT inhaler            Return in about 6 months (around 2/23/2022) for follow-up visit.    Desmond Lomax MD  [facility] [place] is a 46 year old who presents for the following health issues     History of Present Illness     Asthma:  He presents for follow up of asthma.  He has no cough, no wheezing, and no shortness of breath. He is not using a relief medication. He does not miss any doses of his controller medication throughout the week.Patient is aware of the following triggers: unaware of any triggers. The patient has had a visit to the Emergency Room, Urgent Care or Hospital due to asthma since the last clinic visit. He has been to the Emergency Room or Urgent Care 0 times.He has had a Hospitalization 0 times.   He is taking medications regularly.       ED/UC Followup:    Facility:  [facility]  Date of visit: 7/27/21  Reason for visit: asthma  Current Status: Improved.  Patient has a history of frequent urgent care visits for asthma exacerbations.  At most recent visit he was convinced to return to primary care to manage his asthma.  States that his symptoms have improved significantly on fluticasone/salmeterol.  Out-of-pocket cost for each inhaler per month is about $100, but he has recently applied for health insurance and is waiting for a response soon.  Denies chest pain wheezing or shortness of breath.  States he has not needed to use albuterol since starting fluticasone salmeterol.       Patient Active Problem List   Diagnosis     Mild persistent asthma  "without complication     Current Outpatient Medications   Medication Sig Dispense Refill     fluticasone-salmeterol (AIRDUO RESPICLICK) 113-14 MCG/ACT inhaler Inhale 1 puff into the lungs 2 times daily 1 each 11     albuterol (PROAIR HFA/PROVENTIL HFA/VENTOLIN HFA) 108 (90 Base) MCG/ACT inhaler Inhale 2 puffs into the lungs every 6 hours as needed for shortness of breath / dyspnea or wheezing 18 g 0     fluticasone-salmeterol (AIRDUO RESPICLICK) 113-14 MCG/ACT inhaler Inhale 1 puff into the lungs 2 times daily 1 each 0        Objective    /70 (Cuff Size: Adult Regular)   Pulse 84   Temp 98.4  F (36.9  C) (Tympanic)   Resp 16   Ht 1.702 m (5' 7\")   Wt 81.2 kg (179 lb)   SpO2 99%   BMI 28.04 kg/m    Body mass index is 28.04 kg/m .  Physical Exam   GENERAL: healthy, alert and no distress  EYES: Eyes grossly normal to inspection, PERRL and conjunctivae and sclerae normal  NECK: no adenopathy, no asymmetry, masses, or scars and thyroid normal to palpation  RESP: lungs clear to auscultation - no rales, rhonchi or wheezes  CV: regular rate and rhythm, normal S1 S2, no S3 or S4, no murmur, click or rub, no peripheral edema and peripheral pulses strong  PSYCH: mentation appears normal, affect normal/bright    "

## 2021-08-24 ASSESSMENT — ASTHMA QUESTIONNAIRES: ACT_TOTALSCORE: 23

## 2022-11-11 ENCOUNTER — HOSPITAL ENCOUNTER (EMERGENCY)
Facility: CLINIC | Age: 47
End: 2022-11-11
Payer: MEDICAID